# Patient Record
Sex: MALE | Race: WHITE | NOT HISPANIC OR LATINO
[De-identification: names, ages, dates, MRNs, and addresses within clinical notes are randomized per-mention and may not be internally consistent; named-entity substitution may affect disease eponyms.]

---

## 2022-05-06 ENCOUNTER — NON-APPOINTMENT (OUTPATIENT)
Age: 37
End: 2022-05-06

## 2022-05-25 PROBLEM — Z00.00 ENCOUNTER FOR PREVENTIVE HEALTH EXAMINATION: Status: ACTIVE | Noted: 2022-05-25

## 2022-05-26 ENCOUNTER — APPOINTMENT (OUTPATIENT)
Dept: ORTHOPEDIC SURGERY | Facility: CLINIC | Age: 37
End: 2022-05-26
Payer: COMMERCIAL

## 2022-05-26 VITALS — WEIGHT: 222 LBS | HEIGHT: 72 IN | BODY MASS INDEX: 30.07 KG/M2

## 2022-05-26 DIAGNOSIS — Z72.0 TOBACCO USE: ICD-10-CM

## 2022-05-26 DIAGNOSIS — Z78.9 OTHER SPECIFIED HEALTH STATUS: ICD-10-CM

## 2022-05-26 DIAGNOSIS — I10 ESSENTIAL (PRIMARY) HYPERTENSION: ICD-10-CM

## 2022-05-26 DIAGNOSIS — Z82.49 FAMILY HISTORY OF ISCHEMIC HEART DISEASE AND OTHER DISEASES OF THE CIRCULATORY SYSTEM: ICD-10-CM

## 2022-05-26 PROCEDURE — 73560 X-RAY EXAM OF KNEE 1 OR 2: CPT | Mod: RT

## 2022-05-26 PROCEDURE — 99204 OFFICE O/P NEW MOD 45 MIN: CPT | Mod: 25

## 2022-05-26 PROCEDURE — 73565 X-RAY EXAM OF KNEES: CPT

## 2022-05-26 PROCEDURE — 20612 ASPIRATE/INJ GANGLION CYST: CPT

## 2022-05-26 NOTE — HISTORY OF PRESENT ILLNESS
[Gradual] : gradual [0] : 0 [de-identified] : 38 y/o male c/o "bump" on the right knee for the past 3 years. Initially, started after twisting the knee. States size increased with running or heavy activity. Denies pain. No treatment so far. History of bilateral bipartite patella. [] : no [FreeTextEntry1] : right knee [FreeTextEntry5] : No injury, patient had noticed fluid in the knee and wants to drain it.

## 2022-05-26 NOTE — PROCEDURE
[FreeTextEntry3] : The patient has tried OTC's including aspirin, Ibuprofen, Aleve etc or prescription NSAIDS, and/or exercises at home and/ or physical therapy without satisfactory response. The risks, benefits, and alternatives to aspiration were explained in full to the patient. Risks outlined include but are not limited to infection, sepsis, bleeding, scarring, skin discoloration, temporary increase in pain, syncopal episode, failure to resolve symptoms, allergic reaction and symptom recurrence. Patient understood the risks. All questions were answered. After discussion of options, patient requested an aspiration. Oral informed consent was obtained.\par Aspirated 7 cc of gelatenous fluid. \par \par Aspiration was performed of right knee cyst due to pain and swelling. Sterile technique was utilized for the procedure including the preparation of the solutions used for the aspiration. Prep with betadine and alcohol locally to site. \par Amount aspirated: 4cc. \par Gross description: normal appearing synovial fluid. \par Patient tolerated procedure well. \par \par Post Procedure Instructions: Patient was advised to call if redness, pain, or fever occur and apply ice for 15 min. out of every hour for the next 12-24 hours as tolerated. Patient was advised to rest the joint(s) for 1-2 days.\par

## 2022-05-26 NOTE — DISCUSSION/SUMMARY
[de-identified] : The patient was advised of the diagnosis. The natural history of the pathology was explained in full to the patient in layman's terms. All questions were answered. The risks and benefits of surgical and non-surgical treatment alternatives were explained in full to the patient.\par \par Progress note completed by JULIO Eubanks.\par \par Physician Instructions:\par The documentation recorded by the PA accurately reflects the service I personally performed and decisions made by me.\par Ganglion expained and it may recurr. if does then reaspiration and or surgical excision . risks all explained. \par

## 2022-05-26 NOTE — PHYSICAL EXAM
[Right] : right knee [5___] : hamstring 5[unfilled]/5 [] : no erythema [FreeTextEntry8] : mass palpable superior to the patella, nontender, mobile

## 2022-07-21 ENCOUNTER — FORM ENCOUNTER (OUTPATIENT)
Age: 37
End: 2022-07-21

## 2022-07-22 ENCOUNTER — APPOINTMENT (OUTPATIENT)
Dept: MRI IMAGING | Facility: CLINIC | Age: 37
End: 2022-07-22

## 2022-07-22 PROCEDURE — 73721 MRI JNT OF LWR EXTRE W/O DYE: CPT | Mod: RT

## 2022-07-28 ENCOUNTER — NON-APPOINTMENT (OUTPATIENT)
Age: 37
End: 2022-07-28

## 2022-07-29 ENCOUNTER — APPOINTMENT (OUTPATIENT)
Dept: ORTHOPEDIC SURGERY | Facility: CLINIC | Age: 37
End: 2022-07-29

## 2022-07-29 VITALS — HEIGHT: 72 IN | WEIGHT: 222 LBS | BODY MASS INDEX: 30.07 KG/M2

## 2022-07-29 DIAGNOSIS — Z78.9 OTHER SPECIFIED HEALTH STATUS: ICD-10-CM

## 2022-07-29 PROCEDURE — 99214 OFFICE O/P EST MOD 30 MIN: CPT

## 2022-07-29 NOTE — HISTORY OF PRESENT ILLNESS
[Sports related] : sports related [Result of repetitive motion] : result of repetitive motion [4] : 4 [1] : 2 [Dull/Aching] : dull/aching [Sharp] : sharp [Occasional] : occasional [Stairs] : stairs [] : no [de-identified] : MRI  [de-identified] : Alive

## 2022-07-29 NOTE — DISCUSSION/SUMMARY
[de-identified] : General Dx Discussion\par The patient was advised of the diagnosis. The natural history of the pathology was explained in full to the patient in layman's terms. All questions were answered. The risks and benefits of surgical and non-surgical treatment alternatives were explained in full to the patient.\par \par The patient was advised of the diagnosis.  The natural history of the pathology was explained in full to the patient in layman's terms. All questions were answered.  The risks and benefits of surgical and non-surgical treatment alternatives were explained in full to the patient.  \par The patient demonstrated a full understanding of the surgical and non-surgical options.  The risks of surgery were outlined in full to the patient including but not limited to bleeding, scarring, infection, sepsis, neurologic injury, vascular injury, failure to resolve symptoms, symptom recurrence, the need for further surgery, non-healing, wound breakdown, deep vein thrombosis, pulmonary embolism, spontaneous osteonecrosis, anesthesia complications and even death.  The patient understood all the risks and accepted them and understood that other complications could occur that are not mentioned above.  The intraoperative plan, post-operative plan, post-operative expectations and limitations were explained in full.  Expectations from non-surgical treatment were explained in full as well.  The patient demonstrated a complete understanding of the treatment alternatives and requested the above-mentioned procedure.  This will be scheduled accordingly.\par \par Advised of mass returning, nerve injury with permanent numbness, poss need for further surgeyr in the future. \par Questions answered.

## 2022-07-29 NOTE — DATA REVIEWED
[MRI] : MRI [Report was reviewed and noted in the chart] : The report was reviewed and noted in the chart [I reviewed the films/CD] : I reviewed the films/CD

## 2022-07-29 NOTE — PHYSICAL EXAM
[Right] : right knee [NL (0)] : extension 0 degrees [5___] : hamstring 5[unfilled]/5 [Negative] : negative Sarah's [] : no calf tenderness [FreeTextEntry3] : mass superior medial patella noted  [TWNoteComboBox7] : flexion 120 degrees

## 2022-08-03 NOTE — REASON FOR VISIT
Consent: The patient's consent was obtained including but not limited to risks of crusting, scabbing, blistering, scarring, darker or lighter pigmentary change, recurrence, incomplete removal and infection. Number Of Freeze-Thaw Cycles: 1 freeze-thaw cycle Show Aperture Variable?: Yes Add 52 Modifier (Optional): no [Family Member] : family member [FreeTextEntry2] : New consult from Dr King Rt Knee\par has a mass rt knee \par had an aspiration but mass recurred s/p mri  Detail Level: Detailed Duration Of Freeze Thaw-Cycle (Seconds): 15-20 Spray Paint Text: The liquid nitrogen was applied to the skin utilizing a spray paint frosting technique. Medical Necessity Information: It is in your best interest to select a reason for this procedure from the list below. All of these items fulfill various CMS LCD requirements except the new and changing color options. Post-Care Instructions: I reviewed with the patient in detail post-care instructions. Patient knows to avoid picking at any of the treated lesions. Pt may apply Vaseline and bandage to crusted or scabbing areas if needed for comfort. Medical Necessity Clause: This procedure was medically necessary because the lesions that were treated were:

## 2022-08-11 ENCOUNTER — APPOINTMENT (OUTPATIENT)
Dept: ORTHOPEDIC SURGERY | Facility: CLINIC | Age: 37
End: 2022-08-11

## 2022-10-27 ENCOUNTER — FORM ENCOUNTER (OUTPATIENT)
Age: 37
End: 2022-10-27

## 2022-11-07 ENCOUNTER — NON-APPOINTMENT (OUTPATIENT)
Age: 37
End: 2022-11-07

## 2022-11-11 ENCOUNTER — RESULT REVIEW (OUTPATIENT)
Age: 37
End: 2022-11-11

## 2022-11-11 ENCOUNTER — APPOINTMENT (OUTPATIENT)
Dept: ORTHOPEDIC SURGERY | Facility: AMBULATORY SURGERY CENTER | Age: 37
End: 2022-11-11
Payer: COMMERCIAL

## 2022-11-11 PROCEDURE — 27347 REMOVE KNEE CYST: CPT | Mod: RT

## 2022-11-11 RX ORDER — HYDROCODONE BITARTRATE AND ACETAMINOPHEN 5; 300 MG/1; MG/1
5-300 TABLET ORAL EVERY 4 HOURS
Qty: 30 | Refills: 0 | Status: COMPLETED | COMMUNITY
Start: 2022-11-11 | End: 2022-11-16

## 2022-11-11 RX ORDER — ASPIRIN/ACETAMINOPHEN/CAFFEINE 500-325-65
325 POWDER IN PACKET (EA) ORAL
Qty: 30 | Refills: 0 | Status: COMPLETED | COMMUNITY
Start: 2022-11-11 | End: 2022-12-11

## 2022-11-17 ENCOUNTER — APPOINTMENT (OUTPATIENT)
Dept: ORTHOPEDIC SURGERY | Facility: CLINIC | Age: 37
End: 2022-11-17
Payer: COMMERCIAL

## 2022-11-17 VITALS — BODY MASS INDEX: 30.07 KG/M2 | WEIGHT: 222 LBS | HEIGHT: 72 IN

## 2022-11-17 PROCEDURE — 99024 POSTOP FOLLOW-UP VISIT: CPT

## 2022-11-17 NOTE — PHYSICAL EXAM
[Right] : right knee [NL (0)] : extension 0 degrees [] : able to do straight leg raise [TWNoteComboBox7] : flexion 120 degrees

## 2022-11-17 NOTE — DISCUSSION/SUMMARY
[de-identified] : General Dx Discussion\par The patient was advised of the diagnosis. The natural history of the pathology was explained in full to the patient in layman's terms. All questions were answered. The risks and benefits of surgical and non-surgical treatment alternatives were explained in full to the patient.\par \par will f/u 4 weeks ice

## 2022-11-17 NOTE — HISTORY OF PRESENT ILLNESS
[1] : 2 [0] : 0 [Sharp] : sharp [Occasional] : occasional [Full time] : Work status: full time [de-identified] : Right knee arthroscopy, open excision, DOS 11/11/22. [] : no [FreeTextEntry1] : right knee [de-identified] : none

## 2022-12-16 ENCOUNTER — APPOINTMENT (OUTPATIENT)
Dept: ORTHOPEDIC SURGERY | Facility: CLINIC | Age: 37
End: 2022-12-16
Payer: COMMERCIAL

## 2022-12-16 VITALS — WEIGHT: 222 LBS | BODY MASS INDEX: 30.07 KG/M2 | HEIGHT: 72 IN

## 2022-12-16 PROCEDURE — 99024 POSTOP FOLLOW-UP VISIT: CPT

## 2022-12-16 NOTE — HISTORY OF PRESENT ILLNESS
[1] : 2 [0] : 0 [Sharp] : sharp [Occasional] : occasional [Full time] : Work status: full time [de-identified] : Right knee arthroscopy, open excision, DOS 11/11/22. Overall, doing well. Still notes some stiffness. [] : no [FreeTextEntry1] : right knee [de-identified] : none

## 2022-12-16 NOTE — PHYSICAL EXAM
[Right] : right knee [NL (0)] : extension 0 degrees [5___] : hamstring 5[unfilled]/5 [] : able to do straight leg raise [TWNoteComboBox7] : flexion 130 degrees

## 2023-02-27 ENCOUNTER — APPOINTMENT (OUTPATIENT)
Dept: ORTHOPEDIC SURGERY | Facility: CLINIC | Age: 38
End: 2023-02-27
Payer: COMMERCIAL

## 2023-02-27 VITALS — WEIGHT: 220 LBS | HEIGHT: 72 IN | BODY MASS INDEX: 29.8 KG/M2

## 2023-02-27 DIAGNOSIS — M67.461 GANGLION, RIGHT KNEE: ICD-10-CM

## 2023-02-27 DIAGNOSIS — M25.861 OTHER SPECIFIED JOINT DISORDERS, RIGHT KNEE: ICD-10-CM

## 2023-02-27 PROCEDURE — 99213 OFFICE O/P EST LOW 20 MIN: CPT

## 2023-02-27 NOTE — HISTORY OF PRESENT ILLNESS
[2] : 2 [0] : 0 [Dull/Aching] : dull/aching [Intermittent] : intermittent [Physical therapy] : physical therapy [Full time] : Work status: full time [de-identified] : Patient is here for a follow up on his right knee. [] : no [FreeTextEntry1] : Right knee [FreeTextEntry6] : pressure [de-identified] : Physical therapy 2 x a week, a little home exercise.

## 2023-02-27 NOTE — DISCUSSION/SUMMARY
[de-identified] : General Dx Discussion\par The patient was advised of the diagnosis. The natural history of the pathology was explained in full to the patient in layman's terms. All questions were answered. The risks and benefits of surgical and non-surgical treatment alternatives were explained in full to the patient.\par \par will complete PT f/u prn

## 2023-02-27 NOTE — PHYSICAL EXAM
[Right] : right knee [NL (0)] : extension 0 degrees [5___] : hamstring 5[unfilled]/5 [] : patient ambulates without assistive device [TWNoteComboBox7] : flexion 130 degrees

## 2023-10-15 ENCOUNTER — NON-APPOINTMENT (OUTPATIENT)
Age: 38
End: 2023-10-15

## 2023-12-11 RX ORDER — METHYLPREDNISOLONE 4 MG/1
4 TABLET ORAL
Qty: 1 | Refills: 0 | Status: COMPLETED | COMMUNITY
Start: 2023-12-11 | End: 2023-12-17

## 2024-07-12 ENCOUNTER — TRANSCRIPTION ENCOUNTER (OUTPATIENT)
Age: 39
End: 2024-07-12

## 2025-06-21 ENCOUNTER — NON-APPOINTMENT (OUTPATIENT)
Age: 40
End: 2025-06-21